# Patient Record
Sex: MALE | Race: WHITE
[De-identification: names, ages, dates, MRNs, and addresses within clinical notes are randomized per-mention and may not be internally consistent; named-entity substitution may affect disease eponyms.]

---

## 2017-10-11 ENCOUNTER — HOSPITAL ENCOUNTER (OUTPATIENT)
Dept: HOSPITAL 63 - SURG | Age: 82
Discharge: HOME | End: 2017-10-11
Attending: ANESTHESIOLOGY
Payer: MEDICARE

## 2017-10-11 DIAGNOSIS — K44.9: ICD-10-CM

## 2017-10-11 DIAGNOSIS — M46.1: Primary | ICD-10-CM

## 2017-10-11 DIAGNOSIS — E11.9: ICD-10-CM

## 2017-10-11 DIAGNOSIS — Z88.5: ICD-10-CM

## 2017-10-11 PROCEDURE — 64636 DESTROY L/S FACET JNT ADDL: CPT

## 2017-10-11 PROCEDURE — 64635 DESTROY LUMB/SAC FACET JNT: CPT

## 2017-10-11 PROCEDURE — 82947 ASSAY GLUCOSE BLOOD QUANT: CPT

## 2020-11-09 ENCOUNTER — HOSPITAL ENCOUNTER (OUTPATIENT)
Dept: HOSPITAL 63 - DXRAD | Age: 85
End: 2020-11-09
Attending: INTERNAL MEDICINE
Payer: MEDICARE
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
## 2020-11-09 DIAGNOSIS — S90.31XA: Primary | ICD-10-CM

## 2020-11-09 PROCEDURE — 73630 X-RAY EXAM OF FOOT: CPT

## 2020-11-09 NOTE — RAD
PROCEDURE: FOOT RIGHT 3V

 

CLINICAL INDICATION / HISTORY: Reason: SWOLLEN, FOOT PAIN, NO KNOWN INJURY

/ Spl. Instructions:  / History: .

 

TECHNIQUE: AP, lateral and oblique views of the right foot.

 

COMPARISON: None

 

FINDINGS: No fracture or dislocation is identified. The bone density is 

normal. The joint space widths are maintained, and there are no erosions 

to suggest an inflammatory arthropathy. No soft tissue abnormality is 

seen. Suture material in the dorsum of the distal great toe phalanx is 

present.

 

IMPRESSION: No acute osseous abnormality. 

 

Electronically signed by: Kin Abel MD (11/9/2020 12:50 PM) 

[id number]

## 2021-05-26 ENCOUNTER — HOSPITAL ENCOUNTER (OUTPATIENT)
Dept: HOSPITAL 63 - US | Age: 86
End: 2021-05-26
Attending: THORACIC SURGERY (CARDIOTHORACIC VASCULAR SURGERY)
Payer: MEDICARE

## 2021-05-26 DIAGNOSIS — I71.4: Primary | ICD-10-CM

## 2021-05-26 PROCEDURE — 76770 US EXAM ABDO BACK WALL COMP: CPT

## 2021-05-26 NOTE — RAD
EXAMINATION: US ABDOMINAL AORTA SCREENING AAA



INDICATION: Reason: AAA / Spl. Instructions:  / History: 



COMPARISON: None



TECHNIQUE: Grayscale, color and spectral doppler evaluation of the abdominal aorta and common iliac a
rteries was performed.



FINDINGS:

Limited exam due to overlying bowel gas and patient's body habitus. Visualized abdominal aorta demons
trates atherosclerotic calcifications.



AORTIC DIAMETERS:



Supraceliac: Not visualized due to overlying bowel gas.



Juxtarenal AP: 2.1 cm Juxtarenal Transverse: 2.5 cm Velocity: 91 cm/second



Bifurcation AP: 3.3 cm Bifurcation Transverse: 3.7 cm Velocity: 112 cm/second



IMPRESSION:

Mild infrarenal abdominal aortic aneurysm measuring up to 3.7 cm. Abdominal aortic aneurysm measuring
 3.5-3.9 cm as above. Recommend follow-up ultrasound or CTA in 2 years per ACR and SVS recommendation
s.



Electronically signed by: Jose Mclain MD (5/26/2021 8:45 AM) [id number]

## 2021-08-20 ENCOUNTER — HOSPITAL ENCOUNTER (OUTPATIENT)
Dept: HOSPITAL 63 - RAD | Age: 86
End: 2021-08-20
Payer: MEDICARE

## 2021-08-20 DIAGNOSIS — M79.89: Primary | ICD-10-CM

## 2021-08-20 PROCEDURE — 73130 X-RAY EXAM OF HAND: CPT

## 2021-08-20 NOTE — RAD
Exam performed: 3 views right hand.



HISTORY: Patient fell 7 weeks ago, complaining of right hand pain.



DATE OF SERVICE: 8/20/2021.



COMPARISON: None available



FINDINGS:



AP, lateral and oblique views of the right hand are obtained. Normal alignment is preserved. There is
 no acute fracture or dislocation. There is mild soft tissue swelling or foreign body seen.



IMPRESSION:



Soft tissue swelling without underlying bony abnormality.



Electronically signed by: Morena Isaacs MD (8/20/2021 3:21 PM) [id number]